# Patient Record
Sex: MALE | Race: OTHER | HISPANIC OR LATINO | URBAN - METROPOLITAN AREA
[De-identification: names, ages, dates, MRNs, and addresses within clinical notes are randomized per-mention and may not be internally consistent; named-entity substitution may affect disease eponyms.]

---

## 2022-01-27 ENCOUNTER — EMERGENCY (EMERGENCY)
Facility: HOSPITAL | Age: 32
LOS: 1 days | Discharge: HOME | End: 2022-01-27
Attending: EMERGENCY MEDICINE | Admitting: EMERGENCY MEDICINE
Payer: MEDICAID

## 2022-01-27 VITALS
OXYGEN SATURATION: 98 % | RESPIRATION RATE: 16 BRPM | DIASTOLIC BLOOD PRESSURE: 72 MMHG | HEIGHT: 69 IN | TEMPERATURE: 98 F | HEART RATE: 80 BPM | SYSTOLIC BLOOD PRESSURE: 123 MMHG | WEIGHT: 205.03 LBS

## 2022-01-27 DIAGNOSIS — H57.11 OCULAR PAIN, RIGHT EYE: ICD-10-CM
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
## 2022-01-27 DIAGNOSIS — S05.01XA INJURY OF CONJUNCTIVA AND CORNEAL ABRASION WITHOUT FOREIGN BODY, RIGHT EYE, INITIAL ENCOUNTER: ICD-10-CM
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
## 2022-01-27 PROCEDURE — 99283 EMERGENCY DEPT VISIT LOW MDM: CPT

## 2022-01-27 RX ORDER — ERYTHROMYCIN BASE 5 MG/GRAM
1 OINTMENT (GRAM) OPHTHALMIC (EYE) ONCE
Refills: 0 | Status: COMPLETED | OUTPATIENT
Start: 2022-01-27 | End: 2022-01-27

## 2022-01-27 RX ADMIN — Medication 1 APPLICATION(S): at 12:01

## 2022-01-27 NOTE — ED PROVIDER NOTE - PATIENT PORTAL LINK FT
You can access the FollowMyHealth Patient Portal offered by [facility] by registering at the following website: [email or web address]. By joining [facility]’s FollowMyHealth portal, you will also be able to view your health information using other applications (apps) compatible with our system.

## 2022-01-27 NOTE — ED ADULT NURSE NOTE - OBJECTIVE STATEMENT
Pt w/o PMH presents c/o 4/10 right eye pain and redness 2ndary to sensation of foreign object.  Pt's eyes washed.  Pt endorses continued sensation object.  Pt denies itching or visual change.  +redness to cheek area around eye.

## 2022-01-27 NOTE — ED PROVIDER NOTE - PHYSICAL EXAMINATION
CONSTITUTIONAL: Well appearing, well nourished, awake, alert, oriented to person, place, time/situation and in no apparent distress.  ENT: Airway patent, Nasal mucosa clear. Mouth with normal mucosa.  EYES: + OD erythema lateral cornea w/uptake of fluorescein, no FB, lids flipped w/no abnormalities to lid, negative sidell's sign, OS wnl   RESPIRATORY: Breathing comfortably with normal RR.  MSK: Range of motion is not limited, no deformities noted.  NEURO: Alert and oriented, no focal deficits.  SKIN: Skin normal color for race, warm, dry and intact. No evidence of rash.  PSYCH: Alert and oriented to person, place, time/situation. normal mood and affect. no apparent risk to self or others.

## 2022-01-27 NOTE — ED ADULT NURSE NOTE - CHPI ED NUR SYMPTOMS NEG
no blurred vision/no double vision/no drainage/no eye lid swelling/no itching/no photophobia/no purulent drainage

## 2022-01-27 NOTE — ED PROVIDER NOTE - NSFOLLOWUPINSTRUCTIONS_ED_ALL_ED_FT
Corneal Abrasion     WHAT YOU NEED TO KNOW:    A corneal abrasion is a scratch on the cornea of your eye. The cornea is the clear layer that covers the front of your eye. A small scratch may heal in 1 to 2 days. Deeper or larger scratches may take longer to heal.     Eye Anatomy         DISCHARGE INSTRUCTIONS:    Call your doctor or ophthalmologist if:   •Your eye pain or vision gets worse.      •You have yellow or green drainage from your eye.      •You have questions or concerns about your condition or care.      Medicines:   •Medicines may be given in the form of eyedrops or ointment to help prevent an eye infection. You may also be given eyedrops to decrease pain.      •Take your medicine as directed. Contact your healthcare provider if you think your medicine is not helping or if you have side effects. Tell him or her if you are allergic to any medicine. Keep a list of the medicines, vitamins, and herbs you take. Include the amounts, and when and why you take them. Bring the list or the pill bottles to follow-up visits. Carry your medicine list with you in case of an emergency.      Care for your eyes:   •Get regular eye exams. Get your eyes checked at least every year.      •Eat healthy foods. Fresh fruits and vegetables that are rich in vitamins A and C may help with your vision. Foods such as sweet potatoes, apricots, and carrots are rich in nutrients for the eyes.  Sources of Vitamin A       Sources of Vitamin C           •Take care of your contacts or glasses. Store, clean, and use your contacts or glasses as directed. Replace your glasses or contact lenses as often as your healthcare provider suggests.      •Decrease eye strain. Rest your eyes, especially after you read or use a computer for long periods of time. Get plenty of sleep at night. Use lights that reduce glare in your home, school, or workplace.      •Wear dark sunglasses. This will help prevent pain and light sensitivity. Make sure the sunglasses have UVA and UVB protection. This will protect your eyes when you go outside.      •Use eyedrops safely. If your treatment plan includes eyedrops, it is important to use them as directed. Your provider may give you detailed instructions to follow. The eyedrops may also come with safety instructions. Follow all instructions to help prevent an infection. Do not touch the tip of the bottle to your eye. Germs from your eye can spread to the medicine bottle.  Steps 1 2 3 4           Help prevent corneal abrasions:   •Remove your contact lenses if your eyes feel dry or irritated.      •Wash your hands if you need to touch your eyes or your face.  Handwashing           •Trim your fingernails so you cannot scratch your eye.      •Wear protective eyewear when you work with chemicals, wood, dust, or metal.      •Wear protective eyewear when you play sports.      •Do not wear your contacts for longer than you should.      •Do not sleep with your contacts in.      •Do not wear glitter makeup. Glitter can easily get into your eyes and under contact lenses.      Follow up with your doctor or ophthalmologist as directed: Write down your questions so you remember to ask them during your visits.

## 2022-11-10 ENCOUNTER — EMERGENCY (EMERGENCY)
Facility: HOSPITAL | Age: 32
LOS: 1 days | Discharge: HOME | End: 2022-11-10
Attending: EMERGENCY MEDICINE | Admitting: EMERGENCY MEDICINE

## 2022-11-10 VITALS
RESPIRATION RATE: 16 BRPM | SYSTOLIC BLOOD PRESSURE: 165 MMHG | HEART RATE: 82 BPM | DIASTOLIC BLOOD PRESSURE: 78 MMHG | WEIGHT: 212.97 LBS | OXYGEN SATURATION: 99 % | TEMPERATURE: 98 F | HEIGHT: 70 IN

## 2022-11-10 DIAGNOSIS — H10.9 UNSPECIFIED CONJUNCTIVITIS: ICD-10-CM

## 2022-11-10 DIAGNOSIS — H02.843 EDEMA OF RIGHT EYE, UNSPECIFIED EYELID: ICD-10-CM

## 2022-11-10 PROCEDURE — 99283 EMERGENCY DEPT VISIT LOW MDM: CPT

## 2022-11-10 RX ORDER — KETOROLAC TROMETHAMINE 0.5 %
1 DROPS OPHTHALMIC (EYE) ONCE
Refills: 0 | Status: COMPLETED | OUTPATIENT
Start: 2022-11-10 | End: 2022-11-10

## 2022-11-10 RX ORDER — ACETAMINOPHEN 500 MG
975 TABLET ORAL ONCE
Refills: 0 | Status: COMPLETED | OUTPATIENT
Start: 2022-11-10 | End: 2022-11-10

## 2022-11-10 RX ORDER — ERYTHROMYCIN BASE 5 MG/GRAM
1 OINTMENT (GRAM) OPHTHALMIC (EYE) ONCE
Refills: 0 | Status: COMPLETED | OUTPATIENT
Start: 2022-11-10 | End: 2022-11-10

## 2022-11-10 RX ADMIN — Medication 975 MILLIGRAM(S): at 09:22

## 2022-11-10 RX ADMIN — Medication 1 APPLICATION(S): at 09:37

## 2022-11-10 RX ADMIN — Medication 1 DROP(S): at 09:22

## 2022-11-10 NOTE — ED PROVIDER NOTE - OBJECTIVE STATEMENT
30m pmh previous corneal abrasions in R eye presents to the ED with R eye swelling x1 day awoke w/ eye redness, eyelid swelling, tearing of R eye, no photophobia no weakness, no ear pain no headache, pt states that coworker developed identical symptoms today as well. Pt denies any change in vision, 30m pmh previous corneal abrasions in R eye presents to the ED with R eye swelling x1 day awoke w/ eye redness, eyelid swelling, tearing of R eye, no photophobia no weakness, no ear pain no headache, pt states that coworker developed identical symptoms today as well. Pt denies any change in vision. Pt sexually active w/ 1 partner no joint pain no penile discharge

## 2022-11-10 NOTE — ED PROVIDER NOTE - NSFOLLOWUPINSTRUCTIONS_ED_ALL_ED_FT
You were seen in the Emergency Department for eye pain.    You may take 975 mg Tylenol (acetaminophen) every six hours as needed for pain.    Use toradol eye drops, 1 drop in your R eye up to 4 times a day as needed for eye pain.    Do not return to work until symptoms completely resolved.     If your eye redness worsens, if your pain increases, if you have vision changes, either return to the emergency department or follow up with [facility], [phone], [address] [place] [address] [place] [address].    If you have fever, chills, nausea, vomiting, new or worsening pain, or if you have any new symptoms return to the Emergency Department.

## 2022-11-10 NOTE — ED PROVIDER NOTE - NS ED ATTENDING STATEMENT MOD
I have seen and examined this patient and fully participated in the care of this patient as the teaching attending.  The service was shared with the GAIL.  I reviewed and verified the documentation and independently performed the documented:

## 2022-11-10 NOTE — ED PROVIDER NOTE - PHYSICAL EXAMINATION
CONSTITUTIONAL: R eye redness  SKIN: Warm dry, normal skin turgor  HEAD: NCAT  EYES: EOMI, PERRLA, R eye scleral injection w/ limbic sparing, conjunctival erythema, eyelid swelling, tearing  ENT: normal pharynx with no erythema or exudates  NECK: Supple; non tender. Full ROM.  MSK: Full ROM,  PSYCH: Cooperative, appropriate.

## 2022-11-10 NOTE — ED PROVIDER NOTE - CLINICAL SUMMARY MEDICAL DECISION MAKING FREE TEXT BOX
w/ hx of coworkers w/ similar symptoms most likley viral conjunctivitis, pt c/o foreign body sensation in R eye and w/ prior hx of corneal abrasions will check eye for corneal abrasion, will give symptomatic control w/ toradol drops w/ hx of coworkers w/ similar symptoms most likley viral conjunctivitis, pt c/o foreign body sensation in R eye and w/ prior hx of corneal abrasions will check eye for corneal abrasion, will give symptomatic control w/ toradol drops. No concern for glaucoma, symptoms not c/w scleritis, no trauma to eye, not likely allergic w/ history of spread to coworkers.

## 2022-11-10 NOTE — ED PROVIDER NOTE - PATIENT PORTAL LINK FT
You can access the FollowMyHealth Patient Portal offered by [facility] by registering at the following website: [email or web address]. By joining [facility]’s FollowMyHealth portal, you will also be able to view your health information using other applications (apps) compatible with our system.

## 2022-11-10 NOTE — ED PROVIDER NOTE - NS ED ROS FT
Constitutional:  (-) fever, (-) chills, (-) lethargy  Eyes:  (+) eye pain (-) visual changes  ENMT: (-) nasal discharge, (-) sore throat. (-) neck pain or stiffness  Cardiac: (-) chest pain (-) palpitations  Respiratory:  (-) cough (-) respiratory distress.   GI:  (-) nausea (-) vomiting (-) diarrhea (-) abdominal pain.  :  (-) dysuria (-) frequency (-) burning.  MS:  (-) back pain (-) joint pain.  Neuro:  (-) headache (-) numbness (-) tingling (-) focal weakness  Skin:  (-) rash  Except as documented in the HPI,  all other systems are negative